# Patient Record
Sex: MALE | Race: WHITE | URBAN - METROPOLITAN AREA
[De-identification: names, ages, dates, MRNs, and addresses within clinical notes are randomized per-mention and may not be internally consistent; named-entity substitution may affect disease eponyms.]

---

## 2019-01-18 ENCOUNTER — OFFICE VISIT (OUTPATIENT)
Dept: URGENT CARE | Facility: URGENT CARE | Age: 39
End: 2019-01-18
Payer: OTHER MISCELLANEOUS

## 2019-01-18 VITALS
DIASTOLIC BLOOD PRESSURE: 78 MMHG | HEART RATE: 73 BPM | WEIGHT: 190.6 LBS | OXYGEN SATURATION: 96 % | TEMPERATURE: 98.4 F | SYSTOLIC BLOOD PRESSURE: 114 MMHG
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
## 2019-01-18 DIAGNOSIS — S61.012A LACERATION OF SKIN OF LEFT THUMB, INITIAL ENCOUNTER: Primary | ICD-10-CM

## 2019-01-18 PROCEDURE — 99203 OFFICE O/P NEW LOW 30 MIN: CPT | Mod: 25 | Performed by: FAMILY MEDICINE

## 2019-01-18 PROCEDURE — 12001 RPR S/N/AX/GEN/TRNK 2.5CM/<: CPT | Performed by: FAMILY MEDICINE

## 2019-01-18 RX ORDER — CEPHALEXIN 500 MG/1
500 CAPSULE ORAL 3 TIMES DAILY
Qty: 30 CAPSULE | Refills: 0 | Status: SHIPPED | OUTPATIENT
Start: 2019-01-18 | End: 2019-01-18

## 2019-01-18 RX ORDER — CEPHALEXIN 500 MG/1
500 CAPSULE ORAL 3 TIMES DAILY
Qty: 30 CAPSULE | Refills: 0 | Status: SHIPPED | OUTPATIENT
Start: 2019-01-18

## 2019-01-18 NOTE — LETTER
[facility]  [address]  [facility] [address]  [phone]        2019    REPORT OF WORK ABILITY    PATIENT DATA  Employee Name: Erwin Jones        : 1980   [id number]  Work related injury: YES  Today's date: 2019  Date of injury: 2019      PROVIDER EVALUATION: Please fill in as needed.  Please give copy to employee for employer.  1. Diagnosis: Laceration tip  Of left thumb  2. Treatment:  Wound cleaned and sutured  3. Medication: only if signs of pus, redness- start antibiotic Keflex  NOTE: When ordering a medication, [place] Rules require Work Comp or WC on prescriptions.  4. Return to work date: May return today with the following: * Keep injury site clean and dry . DURATION OF LIMITATIONS: 10 days      RESTRICTIONS: Unlimited unless listed.  Restrictions apply to home and leisure also.  If work within restrictions is not available, the employee is totally disabled.     Medical Examiner: Sarah Bernal MD       Next appointment: 10 days for suture removal-  Or sooner if further concerns    CC: Employer, Managed Care Plan/Payor, Patient

## 2019-01-18 NOTE — PATIENT INSTRUCTIONS
Patient Education     Hand Laceration: All Closures  A laceration is a cut through the skin. Deep cuts usually require stitches. Minor cuts may be closed with surgical tape or skin adhesive.   X-rays may be done if something may have entered the skin through the cut, such as broken glass. You may also be given a tetanus shot if you are not up to date on this vaccination and the object that cut you may carry tetanus.    Home care    Your healthcare provider may prescribe an antibiotic. This is to help prevent infection. Follow all instructions for taking this medicine. Take the medicine every day until it is gone or you are told to stop. You should not have any left over.    The healthcare provider may prescribe medicines for pain. Follow instructions for taking them.    Follow the healthcare provider s instructions on how to care for the cut.    Keep the wound clean and dry. Don't get the wound wet until you are told it is OK to do so. If the bandage gets wet, remove it. Gently pat the wound dry with a clean cloth. Then put on a clean, dry bandage.    To help prevent infection, wash your hands with soap and water before and after caring for the wound.     Caring for stiches: Once you no longer need to keep the stitches dry, clean the wound daily. First, remove the bandage. Then wash the area gently with soap and warm water, or as directed by the healthcare provider. Use a wet cotton swab to loosen and remove any blood or crust that forms. After cleaning, apply a thin layer of antibiotic ointment if advised. Then put on a new bandage unless you are told not to.    Caring for skin glue: Don t put apply liquid, ointment, or cream on the wound while the glue is in place. Avoid activities that cause heavy sweating. Protect the wound from sunlight. Don't scratch, rub, or pick at the adhesive film. Don't place tape directly over the film. The glue should peel off within 5 to 10 days.     Caring for surgical tape: Keep  the area dry. If it gets wet, blot it dry with a clean towel. Surgical tape usually falls off within 7 to 10 days. If it has not fallen off after 10 days, you can take it off yourself. Put mineral oil or petroleum jelly on a cotton ball and gently rub the tape until it is removed.    Once you can get the wound wet, you may shower as usual, but don't soak the wound in water. This means no tub baths or swimming.    Even with proper treatment, a wound infection may sometimes occur. Check the wound daily for signs of infection listed below.  Follow-up care  Follow up with your healthcare provider, or as advised. If you have stitches, be sure to return as directed to have them removed.  When to seek medical advice  Call your healthcare provider right away if any of these occur:    Wound bleeding not controlled by direct pressure    Signs of infection, including increasing pain in the wound, increasing wound redness or swelling, or pus or bad odor coming from the wound    Fever of 100.4 F (38. C) o higher, or as directed by your healthcare provider    Stitches come apart or fall out or surgical tape falls off before 7 days    Wound edges reopen    Wound changes colors    Numbness or weakness in the affected hand     Decreased movement of the hand  Date Last Reviewed: 7/1/2017 2000-2018 [employer]. [address] [place] [address], [place] [address]. All rights reserved. This information is not intended as a substitute for professional medical care. Always follow your healthcare professional's instructions.

## 2019-01-19 NOTE — PROGRESS NOTES
SUBJECTIVE:     Chief Complaint   Patient presents with     Urgent Care     Work Comp     Laceration     left hand thumb cut with  at work today 30 minutes ago      Erwin Jones is a 38 year old male who presents to the clinic with a laceration on the left hand sustained 1 hour(s) ago.  This is a work related and accidental injury.    Mechanism of injury:-     Associated symptoms: Denies loss of consciousness, vomiting or confusion.  Denies numbness, weakness, or loss of function  Last tetanus booster within 10 years: bearly-  He wants Tdap- and worker's comp only gives tetanus-  He has primary care appt. In 3 days- will have his Tdap then    Patient Active Problem List   Diagnosis     Generalized anxiety disorder         ALLERGIES:  none    No current outpatient medications on file prior to visit.  No current facility-administered medications on file prior to visit.     Social History     Tobacco Use     Smoking status: Never Smoker     Smokeless tobacco: Never Used   Substance Use Topics     Alcohol use: Not on file     Family History:  Non-contributory,  No associated family health conditions    ROS:  CONSTITUTIONAL:NEGATIVE for fever, chills,    EYES: NEGATIVE for vision changes or irritation  ENT/MOUTH: NEGATIVE for ear, mouth and throat problems  RESP:NEGATIVE for significant cough or SOB  GI: NEGATIVE for nausea, abdominal pain     EXAM:      VITALS: /78   Pulse 73   Temp 98.4  F (36.9  C) (Oral)   Wt 86.5 kg (190 lb 9.6 oz)   SpO2 96%     SKIN: Site of wound:  left   Tip of the thumb  Size of laceration: 2 centimeters  Depth of laceration 4 millimeters  Characteristics of the laceration: bleeding- mild, clean, flap type, semi-circular and does not touch the fingernail- is immediately distal to the fingernail  MS: Tendon function intact:  Intact flexion and extension all fingers at DIP, PIP, MCP joints and at the wrist  NEURO: Sensation to light touch intact in all fingers and  distal to the wound:    VASCULAR: Radial Pulse intact.  Capillary refill 2 seconds in the uninjured fingers and 2 seconds in the fingertip distal to the wound        GENERAL:-  Alert, cooperative, mild disrtess  EYES:   EOMI,   conjunctiva clear  HENT:   External ears with no swelling or lesions   Nose and lips without  Swelling, ulcers, erythema or lesions  NECK:   normal pain free ROM  RESP:   no labored respirations, no tachypnea  EXTREMITIES:   Full ROM without expression of pain or limitation x 4 extremities  NEURO:   Normal strength and tone, ambulation without difficulty,   normal speech and mentation  PSYCH:   mentation and affect appears normal and patient appearance--appropriately groomed       Assessment:  Laceration of skin of left thumb, initial encounter     - cephALEXin (KEFLEX) 500 MG capsule; Take 1 capsule (500 mg) by mouth 3 times daily     We discussed that the wound on the hand is in a location that has no   puncture of the local joint capsule or a tendon sheath,  So there is only minor possibility of infection entering into the joint capsule or tendon sheath.  The wound was cleaned/ irrigated as best as possible to try to remove contamination.        Antibiotics are recommended only if there are signs of increased erythema, purulence, streaking.   The patient was given a printed antibiotic prescription to fill if signs of worsening wound infection   The wound should be protected from dirt or other contamination with a bandage or glove and should be changed if the bandage is soiled.     Patient should monitor daily for signs of increased swelling/ redness/ tenderness/  Pain with finger movement or a tender, swollen red joint.  If the infection signs develop while taking oral antibiotics, then the infection will likely need more aggressive treatment in the ER      warm packs to the painful region to encourage blood flow to help with healing     Ibuprofen/ acetaminophen as and alternative for  pain     Follow-up with primary care if healing does not progress as anticipated.     Work related injury     Workability forms provided to patient           PROCEDURE NOTE:       Anesthesia:Wound was locally injected with 2 cc's of Lidocaine 1% plain  Good anesthesia was obtained   Prepped and draped in the usual sterile fashion  Wound and surrounding skin was cleaned with antiseptic soap and sterile water  Wound was explored for any foreign bodies and evaluated for tendon, nerve, vessel or joint involvement.       Closure was simple  Laceration was closed with 3 X 4.0 Nylon interrupted sutures with good hemostasis  Wound and local skin was again cleaned with  Sterile water and dried    Bandage was applied  Patient tolerated the procedure well     Patient advised to keep wound clean and dry  Sutures out in 10 days

## 2019-01-28 ENCOUNTER — OFFICE VISIT (OUTPATIENT)
Dept: NURSING | Facility: CLINIC | Age: 39
End: 2019-01-28
Payer: OTHER MISCELLANEOUS

## 2019-01-28 DIAGNOSIS — Z48.02 VISIT FOR SUTURE REMOVAL: Primary | ICD-10-CM

## 2019-01-28 PROCEDURE — 99207 ZZC NO CHARGE NURSE ONLY: CPT

## 2019-01-28 NOTE — PROGRESS NOTES
Erwniteresa Jones presents to the clinic for removal of sutures and sutures,staples, steri strips. The patient has had sutures in place for 10 days. There has been no patient reported signs or symptoms of infection or drainage. 3  sutures and sutures,staples, staple, steri strips are seen and located on the tip of thumb. Tetanus status is up to date. All sutures and sutures,staples, steri strips were easily removed today. Routine wound care discussed by the RN or provider. The patient will follow up as needed.    Violetta Hobbs RN